# Patient Record
Sex: FEMALE
[De-identification: names, ages, dates, MRNs, and addresses within clinical notes are randomized per-mention and may not be internally consistent; named-entity substitution may affect disease eponyms.]

---

## 2023-10-06 ENCOUNTER — NON-APPOINTMENT (OUTPATIENT)
Age: 23
End: 2023-10-06

## 2023-10-11 PROBLEM — Z00.00 ENCOUNTER FOR PREVENTIVE HEALTH EXAMINATION: Status: ACTIVE | Noted: 2023-10-11

## 2023-10-12 ENCOUNTER — APPOINTMENT (OUTPATIENT)
Dept: ORTHOPEDIC SURGERY | Facility: CLINIC | Age: 23
End: 2023-10-12

## 2024-09-12 ENCOUNTER — NON-APPOINTMENT (OUTPATIENT)
Age: 24
End: 2024-09-12

## 2024-09-17 ENCOUNTER — APPOINTMENT (OUTPATIENT)
Dept: ORTHOPEDIC SURGERY | Facility: CLINIC | Age: 24
End: 2024-09-17
Payer: OTHER MISCELLANEOUS

## 2024-09-17 VITALS — HEIGHT: 63 IN | BODY MASS INDEX: 18.61 KG/M2 | WEIGHT: 105 LBS

## 2024-09-17 DIAGNOSIS — S69.90XA UNSPECIFIED INJURY OF UNSPECIFIED WRIST, HAND AND FINGER(S), INITIAL ENCOUNTER: ICD-10-CM

## 2024-09-17 PROCEDURE — 99204 OFFICE O/P NEW MOD 45 MIN: CPT

## 2024-09-17 NOTE — HISTORY OF PRESENT ILLNESS
[de-identified] : 9/17/24: 24F, SKYE presents with right hand pain due to a worker's comp injury on 9/13/24. Patient reports she went to place a mouth muzzle on a 100lb dog who bit her hand. Went to urgent care hospital had XR imaging done and was prescribed antibiotics. Taking NSAIDs with some relief, denies numbness/ tingling.  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

## 2024-09-17 NOTE — ASSESSMENT
[FreeTextEntry1] : EXAM Right hand with mild swelling; no erythema; no ecchymosis. Well approximated puncture would at mid palm - no erythema nor drainage. Able to make a composite fist, oppose thumb to small finger and abduct fingers. <2sec cap refill.  Right hand radiographs with no fracture nor dislocation. Carpus alignment intact. (3-view as viewed from an outside facility)  ASSESSMENT & PLAN Right hand dog bite - reviewed radiographs and pathoanatomy with the patient. Discussed management to consist of NSAIDs prn, wrist brace prn, elevation, minimize use. Risk of infection, will complete course of abx. Will follow-up sooner should it worsen or infection ensue.  F/u 2-3 weeks to reassess

## 2024-10-01 ENCOUNTER — APPOINTMENT (OUTPATIENT)
Dept: ORTHOPEDIC SURGERY | Facility: CLINIC | Age: 24
End: 2024-10-01